# Patient Record
Sex: MALE | Race: WHITE
[De-identification: names, ages, dates, MRNs, and addresses within clinical notes are randomized per-mention and may not be internally consistent; named-entity substitution may affect disease eponyms.]

---

## 2023-03-24 ENCOUNTER — HOSPITAL ENCOUNTER (OUTPATIENT)
Dept: HOSPITAL 33 - SDC | Age: 38
Discharge: HOME | End: 2023-03-24
Attending: FAMILY MEDICINE
Payer: COMMERCIAL

## 2023-03-24 VITALS — DIASTOLIC BLOOD PRESSURE: 94 MMHG | SYSTOLIC BLOOD PRESSURE: 137 MMHG | HEART RATE: 64 BPM | OXYGEN SATURATION: 96 %

## 2023-03-24 DIAGNOSIS — K62.5: Primary | ICD-10-CM

## 2023-03-24 NOTE — OP
SURGERY DATE:    03/24/2023



SURGERY TIME:      0729



PREOPERATIVE DIAGNOSIS:    

1.  INTERMITTENT RECTAL BLEEDING.



POSTOPERATIVE DIAGNOSIS:    

1.  NORMAL COLON.



PROCEDURE:    

1.  Colonoscopy.



SURGEON:    Dr. Jarrett.



ANESTHESIA:    MAC. Medications given by the Anesthesia Department.



BRIEF HISTORY:    The patient is a 38 y/o WM patient presenting now for colonoscopic 
evaluation. He reports that his uncle has had diverticulitis, but nobody else in the 
family with any colon problems. The patient reports his problem is intermittent and he has 
had no bleeding recently. The patient was felt to need to have endoscopic evaluation. He 
was appraised of the risks of the procedure including the risk of perforation, phlebitis, 
untoward reaction to medication, bleeding, and missed lesions.  The patient verbalized his 
understanding and desired to have the procedure performed.



DESCRIPTION OF PROCEDURE:    The patient was given the medications by the Anesthesia 
Department. He had continuous pulse oximetry, ECG monitoring, and intermittent BP 
monitoring during the examination. He was placed in the left lateral decubitus position. A 
digital rectal examination was performed and revealed normal anal sphincter tone, no 
masses, and a normal prostate.  The flexible Olympus pediatric colonoscope was used to 
intubate the rectum.  A view of the colon was developed sequentially to the cecum. We also 
traversed into the terminal ileum approximately 15 cm. Upon insertion and withdrawal, 
including a retroflex view in the rectum, no mucosal lesions were encountered. The scope 
was removed from the patient who tolerated the procedure well and was sent back to OP 
recovery in good condition. The prep was noted to be fair to good.